# Patient Record
Sex: MALE | URBAN - METROPOLITAN AREA
[De-identification: names, ages, dates, MRNs, and addresses within clinical notes are randomized per-mention and may not be internally consistent; named-entity substitution may affect disease eponyms.]

---

## 2018-01-16 ENCOUNTER — OFFICE VISIT (OUTPATIENT)
Dept: PEDIATRICS | Facility: CLINIC | Age: 43
End: 2018-01-16
Payer: COMMERCIAL

## 2018-01-16 VITALS
SYSTOLIC BLOOD PRESSURE: 126 MMHG | WEIGHT: 149.06 LBS | HEIGHT: 64 IN | HEART RATE: 68 BPM | DIASTOLIC BLOOD PRESSURE: 80 MMHG | BODY MASS INDEX: 25.45 KG/M2 | TEMPERATURE: 96.7 F | OXYGEN SATURATION: 100 %

## 2018-01-16 DIAGNOSIS — Z23 NEED FOR PROPHYLACTIC VACCINATION AND INOCULATION AGAINST INFLUENZA: ICD-10-CM

## 2018-01-16 DIAGNOSIS — M25.561 MEDIAL KNEE PAIN, RIGHT: Primary | ICD-10-CM

## 2018-01-16 PROCEDURE — 99213 OFFICE O/P EST LOW 20 MIN: CPT | Mod: 25 | Performed by: INTERNAL MEDICINE

## 2018-01-16 PROCEDURE — 90471 IMMUNIZATION ADMIN: CPT | Performed by: INTERNAL MEDICINE

## 2018-01-16 PROCEDURE — 90686 IIV4 VACC NO PRSV 0.5 ML IM: CPT | Performed by: INTERNAL MEDICINE

## 2018-01-16 NOTE — MR AVS SNAPSHOT
After Visit Summary   1/16/2018    Curtis Montoya    MRN: [id number]           Patient Information     Date Of Birth          1975        Visit Information        Provider Department      1/16/2018 3:30 PM Dolly Islas MD [facility] [place]        Today's Diagnoses     Need for prophylactic vaccination and inoculation against influenza    -  1    Medial knee pain, right          Care Instructions    Wear supportive knee pads when at work.     Wear supportive brace when not at work.    Use ibuprofen 600mg (3 tabs) up to 3 times daily as needed for pain. Take with food as this can be hard on your stomach.    Use ice 2-3 times a day.     If symptoms are not improving in the next 2 weeks, schedule an appointment with physical therapy.           Follow-ups after your visit        Additional Services     [facility] PT, HAND, AND CHIROPRACTIC REFERRAL       **This order will print in the [facility] Scheduling Office**    Physical Therapy, Hand Therapy and Chiropractic Care are available through:    *[facility]  *[facility]  *[facility]    Call one number to schedule at any of the above locations: [phone].    Your provider has referred you to: Physical Therapy at [facility] or [facility]    Indication/Reason for Referral: Knee Pain  Onset of Illness: 4 months ago  Therapy Orders: Evaluate and Treat  Special Programs: None  Special Request: None    Marilee Hunt      Additional Comments for the Therapist or Chiropractor:     Please be aware that coverage of these services is subject to the terms and limitations of your health insurance plan.  Call member services at your health plan with any benefit or coverage questions.      Please bring the following to your appointment:    *Your personal calendar for scheduling future appointments  *Comfortable clothing                  Who to contact     If you have questions or need follow up information about today's  "clinic visit or your schedule please contact [facility] directly at [phone].  Normal or non-critical lab and imaging results will be communicated to you by MyChart, letter or phone within 4 business days after the clinic has received the results. If you do not hear from us within 7 days, please contact the clinic through BigBadhart or phone. If you have a critical or abnormal lab result, we will notify you by phone as soon as possible.  Submit refill requests through ZOCKO or call your pharmacy and they will forward the refill request to us. Please allow 3 business days for your refill to be completed.          Additional Information About Your Visit        MyChart Information     ZOCKO lets you send messages to your doctor, view your test results, renew your prescriptions, schedule appointments and more. To sign up, go to [email or web address] . Click on \"Log in\" on the left side of the screen, which will take you to the Welcome page. Then click on \"Sign up Now\" on the right side of the page.     You will be asked to enter the access code listed below, as well as some personal information. Please follow the directions to create your username and password.     Your access code is: [email or web address]  Expires: 2018  4:07 PM     Your access code will  in 90 days. If you need help or a new code, please call your [place] clinic or [phone].        Care EveryWhere ID     This is your Care EveryWhere ID. This could be used by other organizations to access your [place] medical records  [id number]        Your Vitals Were     Pulse Temperature Height Pulse Oximetry BMI (Body Mass Index)       68 96.7  F (35.9  C) (Tympanic) 5' 4\" (1.626 m) 100% 25.59 kg/m2        Blood Pressure from Last 3 Encounters:   18 126/80    Weight from Last 3 Encounters:   18 149 lb 1 oz (67.6 kg)              We Performed the Following     FLU VAC, SPLIT VIRUS IM > 3 YO (QUADRIVALENT) [49569]     [facility] PT, " HAND, AND CHIROPRACTIC REFERRAL     Vaccine Administration, Initial [18618]          Today's Medication Changes          These changes are accurate as of: 1/16/18  4:07 PM.  If you have any questions, ask your nurse or doctor.               Start taking these medicines.        Dose/Directions    order for DME   Used for:  Medial knee pain, right   Started by:  Dolly Islas MD        Equipment being ordered: knee brace with patellar support   Quantity:  1 each   Refills:  0            Where to get your medicines      Some of these will need a paper prescription and others can be bought over the counter.  Ask your nurse if you have questions.     Bring a paper prescription for each of these medications     order for DME                Primary Care Provider Office Phone # Fax #    [facility] [phone] [address] [address]        Equal Access to Services     APRIL BLANCA : Bill villarreal Soskye, waaxda luqadaha, qaybta kaalmada adeegyada, arsenio charlton . So [facility] [phone].    ATENCIÓN: Si habla español, tiene a cam disposición servicios gratuitos de asistencia lingüística. Llame al [phone].    We comply with applicable federal civil rights laws and [place] laws. We do not discriminate on the basis of race, color, national origin, age, disability, sex, sexual orientation, or gender identity.            Thank you!     Thank you for choosing [facility]  for your care. Our goal is always to provide you with excellent care. Hearing back from our patients is one way we can continue to improve our services. Please take a few minutes to complete the written survey that you may receive in the mail after your visit with us. Thank you!             Your Updated Medication List - Protect others around you: Learn how to safely use, store and throw away your medicines at www.disposemymeds.org.          This list is  accurate as of: 1/16/18  4:07 PM.  Always use your most recent med list.                   Brand Name Dispense Instructions for use Diagnosis    order for DME     1 each    Equipment being ordered: knee brace with patellar support    Medial knee pain, right

## 2018-01-16 NOTE — NURSING NOTE
"Chief Complaint   Patient presents with     Musculoskeletal Problem     Flu Shot       Initial /80 (BP Location: Right arm, Patient Position: Chair, Cuff Size: Adult Regular)  Pulse 68  Temp 96.7  F (35.9  C) (Tympanic)  Ht 5' 4\" (1.626 m)  Wt 149 lb 1 oz (67.6 kg)  SpO2 100%  BMI 25.59 kg/m2 Estimated body mass index is 25.59 kg/(m^2) as calculated from the following:    Height as of this encounter: 5' 4\" (1.626 m).    Weight as of this encounter: 149 lb 1 oz (67.6 kg).  Medication Reconciliation: complete     Tania Sanz MA 1/16/2018 3:34 PM    "

## 2018-01-16 NOTE — NURSING NOTE
Screening Questionnaire for Adult Immunization    Are you sick today?   No   Do you have allergies to medications, food, a vaccine component or latex?   No   Have you ever had a serious reaction after receiving a vaccination?   No   Do you have a long-term health problem with heart disease, lung disease, asthma, kidney disease, metabolic disease (e.g. diabetes), anemia, or other blood disorder?   No   Do you have cancer, leukemia, HIV/AIDS, or any other immune system problem?   No   In the past 3 months, have you taken medications that affect  your immune system, such as prednisone, other steroids, or anticancer drugs; drugs for the treatment of rheumatoid arthritis, Crohn s disease, or psoriasis; or have you had radiation treatments?   No   Have you had a seizure, or a brain or other nervous system problem?   No   During the past year, have you received a transfusion of blood or blood     products, or been given immune (gamma) globulin or antiviral drug?   No   For women: Are you pregnant or is there a chance you could become        pregnant during the next month?   No   Have you received any vaccinations in the past 4 weeks?   No     Immunization questionnaire answers were all negative.        Per orders of Dr. Acevedo, injection of FLU Shot given by Amie Sanz. Patient instructed to remain in clinic for 15 minutes afterwards, and to report any adverse reaction to me immediately.       Screening performed by Amie Sanz on 1/16/2018 at 3:48 PM.

## 2018-01-16 NOTE — PATIENT INSTRUCTIONS
Wear supportive knee pads when at work.     Wear supportive brace when not at work.    Use ibuprofen 600mg (3 tabs) up to 3 times daily as needed for pain. Take with food as this can be hard on your stomach.    Use ice 2-3 times a day.     If symptoms are not improving in the next 2 weeks, schedule an appointment with physical therapy.

## 2018-06-19 ENCOUNTER — OFFICE VISIT (OUTPATIENT)
Dept: URGENT CARE | Facility: URGENT CARE | Age: 43
End: 2018-06-19
Payer: COMMERCIAL

## 2018-06-19 VITALS — BODY MASS INDEX: 25.58 KG/M2 | OXYGEN SATURATION: 100 % | TEMPERATURE: 96.9 F | HEART RATE: 68 BPM | WEIGHT: 149 LBS

## 2018-06-19 DIAGNOSIS — H66.92 LEFT OTITIS MEDIA, UNSPECIFIED OTITIS MEDIA TYPE: Primary | ICD-10-CM

## 2018-06-19 PROCEDURE — 99213 OFFICE O/P EST LOW 20 MIN: CPT | Performed by: FAMILY MEDICINE

## 2018-06-19 RX ORDER — AMOXICILLIN 500 MG/1
1000 CAPSULE ORAL 2 TIMES DAILY
Qty: 40 CAPSULE | Refills: 0 | Status: SHIPPED | OUTPATIENT
Start: 2018-06-19 | End: 2018-06-29

## 2018-06-19 NOTE — MR AVS SNAPSHOT
"              After Visit Summary   2018    Curtis Montoya    MRN: [id number]           Patient Information     Date Of Birth          1975        Visit Information        Provider Department      2018 6:10 PM Griselda Ríos MD [facility]        Today's Diagnoses     Left otitis media, unspecified otitis media type    -  1       Follow-ups after your visit        Who to contact     If you have questions or need follow up information about today's clinic visit or your schedule please contact [facility] directly at [phone].  Normal or non-critical lab and imaging results will be communicated to you by Accerahart, letter or phone within 4 business days after the clinic has received the results. If you do not hear from us within 7 days, please contact the clinic through Accerahart or phone. If you have a critical or abnormal lab result, we will notify you by phone as soon as possible.  Submit refill requests through TaposÃ©Â© or call your pharmacy and they will forward the refill request to us. Please allow 3 business days for your refill to be completed.          Additional Information About Your Visit        MyChart Information     TaposÃ©Â© lets you send messages to your doctor, view your test results, renew your prescriptions, schedule appointments and more. To sign up, go to [email or web address] . Click on \"Log in\" on the left side of the screen, which will take you to the Welcome page. Then click on \"Sign up Now\" on the right side of the page.     You will be asked to enter the access code listed below, as well as some personal information. Please follow the directions to create your username and password.     Your access code is: [email or web address]  Expires: 2018  7:34 PM     Your access code will  in 90 days. If you need help or a new code, please call your [place] clinic or [phone].        Care EveryWhere ID     This is your Care EveryWhere ID. This " could be used by other organizations to access your [place] medical records  [id number]        Your Vitals Were     Pulse Temperature Pulse Oximetry BMI (Body Mass Index)          68 96.9  F (36.1  C) (Tympanic) 100% 25.58 kg/m2         Blood Pressure from Last 3 Encounters:   01/16/18 126/80    Weight from Last 3 Encounters:   06/19/18 149 lb (67.6 kg)   01/16/18 149 lb 1 oz (67.6 kg)              Today, you had the following     No orders found for display         Today's Medication Changes          These changes are accurate as of 6/19/18  7:34 PM.  If you have any questions, ask your nurse or doctor.               Start taking these medicines.        Dose/Directions    amoxicillin 500 MG capsule   Commonly known as:  AMOXIL   Used for:  Left otitis media, unspecified otitis media type   Started by:  Griselda Ríos MD        Dose:  1000 mg   Take 2 capsules (1,000 mg) by mouth 2 times daily for 10 days   Quantity:  40 capsule   Refills:  0            Where to get your medicines      These medications were sent to [facility] - [address]   [address]  [address], [place] [address]     Phone:  [phone]     amoxicillin 500 MG capsule                Primary Care Provider Office Phone # Fax #    [facility] [phone]       [address]  [place] [address]        Equal Access to Services     APRIL BLANCA AH: Hadii mark patricia hadasho Sopatricaali, waaxda luqadaha, qaybta kaalmada yara, arsenio hudson. So [facility] [phone].    ATENCIÓN: Si habla español, tiene a cam disposición servicios gratuitos de asistencia lingüística. Llame al [phone].    We comply with applicable federal civil rights laws and [place] laws. We do not discriminate on the basis of race, color, national origin, age, disability, sex, sexual orientation, or gender identity.            Thank you!     Thank you for choosing  [facility]  for your care. Our goal is always to provide you with excellent care. Hearing back from our patients is one way we can continue to improve our services. Please take a few minutes to complete the written survey that you may receive in the mail after your visit with us. Thank you!             Your Updated Medication List - Protect others around you: Learn how to safely use, store and throw away your medicines at www.disposemymeds.org.          This list is accurate as of 6/19/18  7:34 PM.  Always use your most recent med list.                   Brand Name Dispense Instructions for use Diagnosis    amoxicillin 500 MG capsule    AMOXIL    40 capsule    Take 2 capsules (1,000 mg) by mouth 2 times daily for 10 days    Left otitis media, unspecified otitis media type       order for DME     1 each    Equipment being ordered: knee brace with patellar support    Medial knee pain, right

## 2018-06-19 NOTE — PROGRESS NOTES
SUBJECTIVE:  Curtis Montoya is a 43 year old male who presents with left ear fullness, hearing loss and pressure for 2 day(s). He describes the severity of his symptoms as moderate and timing as worsening and constant. No drainage from ears or additional symptoms. Denies history of otitis media in the past. Has tried ear wax removal drops without relief. No history of allergies or recent swimming.     No past medical history on file.  Current Outpatient Prescriptions   Medication Sig Dispense Refill     order for DME Equipment being ordered: knee brace with patellar support (Patient not taking: Reported on 6/19/2018) 1 each 0     Social History   Substance Use Topics     Smoking status: Never Smoker     Smokeless tobacco: Never Used     Alcohol use No       ROS:   CONSTITUTIONAL:NEGATIVE for fever, chills, change in weight  EYES: NEGATIVE for vision changes or irritation  RESP:NEGATIVE for significant cough or SOB  GI: NEGATIVE for nausea, abdominal pain,     OBJECTIVE:  Pulse 68  Temp 96.9  F (36.1  C) (Tympanic)  Wt 149 lb (67.6 kg)  SpO2 100%  BMI 25.58 kg/m2   EXAM:  The right TM is normal: no effusions, no erythema, and normal landmarks     The right auditory canal is normal and without drainage, edema or erythema  The left TM is bulging and erythematous  The left auditory canal is erythematous  Oropharynx exam is erythematous.  There are no exudates.  GENERAL: no acute distress  SKIN: no suspicious lesions or rashes   Neck:  Supple, no LAD    ASSESSMENT:  Acute otitis media, left    PLAN:  Start Amoxicillin 1000 mg BID x 10 days. Take full course of antibiotics even if feeling better before complete. Return to care with any new or worsening symptoms.     Pt seen in conjunction with AL Puentes student, who served as a scribe for this encounter. I have reviewed the ROS and PSFH documented by the student.  I performed the pertinent history, exam, and assessment and plan components as documented above.  ~Griselda Ríos M.D.

## 2018-07-06 ENCOUNTER — OFFICE VISIT (OUTPATIENT)
Dept: PEDIATRICS | Facility: CLINIC | Age: 43
End: 2018-07-06
Payer: COMMERCIAL

## 2018-07-06 VITALS
WEIGHT: 145.9 LBS | DIASTOLIC BLOOD PRESSURE: 70 MMHG | HEART RATE: 63 BPM | BODY MASS INDEX: 24.91 KG/M2 | HEIGHT: 64 IN | OXYGEN SATURATION: 98 % | SYSTOLIC BLOOD PRESSURE: 116 MMHG | TEMPERATURE: 97.8 F

## 2018-07-06 DIAGNOSIS — Z00.00 ROUTINE GENERAL MEDICAL EXAMINATION AT A HEALTH CARE FACILITY: Primary | ICD-10-CM

## 2018-07-06 DIAGNOSIS — Z71.84 TRAVEL ADVICE ENCOUNTER: ICD-10-CM

## 2018-07-06 PROCEDURE — 80061 LIPID PANEL: CPT | Performed by: NURSE PRACTITIONER

## 2018-07-06 PROCEDURE — 82947 ASSAY GLUCOSE BLOOD QUANT: CPT | Performed by: NURSE PRACTITIONER

## 2018-07-06 PROCEDURE — 36415 COLL VENOUS BLD VENIPUNCTURE: CPT | Performed by: NURSE PRACTITIONER

## 2018-07-06 PROCEDURE — 99396 PREV VISIT EST AGE 40-64: CPT | Performed by: NURSE PRACTITIONER

## 2018-07-06 ASSESSMENT — ENCOUNTER SYMPTOMS
EYE PAIN: 0
CHILLS: 0
CONSTIPATION: 0
HEMATOCHEZIA: 0
COUGH: 0
HEMATURIA: 0
DIARRHEA: 0
ABDOMINAL PAIN: 0
DIZZINESS: 0

## 2018-07-06 NOTE — PROGRESS NOTES
SUBJECTIVE:   CC: Curtis Montoya is an 43 year old male who presents for preventative health visit.     Physical   Annual:     Getting at least 3 servings of Calcium per day:  NO    Bi-annual eye exam:  NO    Dental care twice a year:  NO    Sleep apnea or symptoms of sleep apnea:  None    Diet:  Vegetarian/vegan    Frequency of exercise:  None    Taking medications regularly:  Yes    Medication side effects:  None    Additional concerns today:  No    [facility] this [facility]. Has had hep b and a vaccines.     Today's PHQ-2 Score:   PHQ-2 ( 1999 Pfizer) 7/6/2018   Q1: Little interest or pleasure in doing things 0   Q2: Feeling down, depressed or hopeless 0   PHQ-2 Score 0   Q1: Little interest or pleasure in doing things Not at all   Q2: Feeling down, depressed or hopeless Not at all   PHQ-2 Score 0     Abuse: Current or Past(Physical, Sexual or Emotional)- No  Do you feel safe in your environment - Yes    Social History   Substance Use Topics     Smoking status: Never Smoker     Smokeless tobacco: Never Used     Alcohol use No     Alcohol Use 7/6/2018   If you drink alcohol do you typically have greater than 3 drinks per day OR greater than 7 drinks per week? Not Applicable   No flowsheet data found.    Last PSA: No results found for: PSA    Reviewed orders with patient. Reviewed health maintenance and updated orders accordingly - Yes  Labs reviewed in EPIC    Reviewed and updated as needed this visit by clinical staff  Tobacco  Allergies  Meds  Med Hx  Surg Hx  Fam Hx  Soc Hx        Reviewed and updated as needed this visit by Provider            Review of Systems   Constitutional: Negative for chills.   HENT: Negative for congestion and ear pain.    Eyes: Negative for pain.   Respiratory: Negative for cough.    Cardiovascular: Negative for chest pain.   Gastrointestinal: Negative for abdominal pain, constipation, diarrhea and hematochezia.   Genitourinary: Negative for hematuria.  "  Neurological: Negative for dizziness.       OBJECTIVE:   /70 (BP Location: Right arm, Cuff Size: Adult Regular)  Pulse 63  Temp 97.8  F (36.6  C) (Tympanic)  Ht 5' 4\" (1.626 m)  Wt 145 lb 14.4 oz (66.2 kg)  SpO2 98%  BMI 25.04 kg/m2    Physical Exam  GENERAL: healthy, alert and no distress  EYES: Eyes grossly normal to inspection, PERRL and conjunctivae and sclerae normal  HENT: ear canals and TM's normal, nose and mouth without ulcers or lesions  NECK: no adenopathy, no asymmetry, masses, or scars and thyroid normal to palpation  RESP: lungs clear to auscultation - no rales, rhonchi or wheezes  CV: regular rate and rhythm, normal S1 S2, no S3 or S4, no murmur, click or rub, no peripheral edema and peripheral pulses strong  MS: no gross musculoskeletal defects noted, no edema  SKIN: no suspicious lesions or rashes  PSYCH: mentation appears normal, affect normal/bright      ASSESSMENT/PLAN:   1. Routine general medical examination at a health care facility    - Lipid panel reflex to direct LDL Non-fasting  - Glucose    2. Travel advice encounter  We reviewed [employer] website. He is not going to area high with malaria. Is up to date with tetanus and hep a and b vacc.   - typhoid (VIVOTIF) CR capsule; Take 1 capsule by mouth every other day  Dispense: 4 capsule; Refill: 0    COUNSELING:   Reviewed preventive health counseling, as reflected in patient instructions  Special attention given to:        Regular exercise       Healthy diet/nutrition    BP Readings from Last 1 Encounters:   07/06/18 116/70     Estimated body mass index is 25.04 kg/(m^2) as calculated from the following:    Height as of this encounter: 5' 4\" (1.626 m).    Weight as of this encounter: 145 lb 14.4 oz (66.2 kg).           reports that he has never smoked. He has never used smokeless tobacco.      Counseling Resources:  ATP IV Guidelines  Pooled Cohorts Equation Calculator  FRAX Risk Assessment  ICSI Preventive Guidelines  Dietary " Guidelines for Americans, 2010  USDA's MyPlate  ASA Prophylaxis  Lung CA Screening    Aisha Whitehead, OSWALDO [facility]

## 2018-07-06 NOTE — MR AVS SNAPSHOT
After Visit Summary   7/6/2018    Curtis Montoya    MRN: [id number]           Patient Information     Date Of Birth          1975        Visit Information        Provider Department      7/6/2018 4:00 PM Aisha Mcgill APRN [facility]        Today's Diagnoses     Routine general medical examination at a health care facility    -  1    Travel advice encounter          Care Instructions      Preventive Health Recommendations  Male Ages 40 to 49    Yearly exam:             See your health care provider every year in order to  o   Review health changes.   o   Discuss preventive care.    o   Review your medicines if your doctor has prescribed any.    You should be tested each year for STDs (sexually transmitted diseases) if you re at risk.     Have a cholesterol test every 5 years.     Have a colonoscopy (test for colon cancer) if someone in your family has had colon cancer or polyps before age 50.     After age 45, have a diabetes test (fasting glucose). If you are at risk for diabetes, you should have this test every 3 years.      Talk with your health care provider about whether or not a prostate cancer screening test (PSA) is right for you.    Shots: Get a flu shot each year. Get a tetanus shot every 10 years.     Nutrition:    Eat at least 5 servings of fruits and vegetables daily.     Eat whole-grain bread, whole-wheat pasta and brown rice instead of white grains and rice.     Get adequate Calcium and Vitamin D.     Lifestyle    Exercise for at least 150 minutes a week (30 minutes a day, 5 days a week). This will help you control your weight and prevent disease.     Limit alcohol to one drink per day.     No smoking.     Wear sunscreen to prevent skin cancer.     See your dentist every six months for an exam and cleaning.              Follow-ups after your visit        Follow-up notes from your care team     Return in about 1 year (around 7/6/2019) for Annual  "preventative exam.      Who to contact     If you have questions or need follow up information about today's clinic visit or your schedule please contact [facility] [place] directly at [phone].  Normal or non-critical lab and imaging results will be communicated to you by MyChart, letter or phone within 4 business days after the clinic has received the results. If you do not hear from us within 7 days, please contact the clinic through MyChart or phone. If you have a critical or abnormal lab result, we will notify you by phone as soon as possible.  Submit refill requests through Ahaali or call your pharmacy and they will forward the refill request to us. Please allow 3 business days for your refill to be completed.          Additional Information About Your Visit        [facility] Information     Ahaali lets you send messages to your doctor, view your test results, renew your prescriptions, schedule appointments and more. To sign up, go to [email or web address] . Click on \"Log in\" on the left side of the screen, which will take you to the Welcome page. Then click on \"Sign up Now\" on the right side of the page.     You will be asked to enter the access code listed below, as well as some personal information. Please follow the directions to create your username and password.     Your access code is: [email or web address]  Expires: 2018  7:34 PM     Your access code will  in 90 days. If you need help or a new code, please call your [place] clinic or [phone].        Care EveryWhere ID     This is your Care EveryWhere ID. This could be used by other organizations to access your [place] medical records  [id number]        Your Vitals Were     Pulse Temperature Height Pulse Oximetry BMI (Body Mass Index)       63 97.8  F (36.6  C) (Tympanic) 5' 4\" (1.626 m) 98% 25.04 kg/m2        Blood Pressure from Last 3 Encounters:   18 116/70   18 126/80    Weight from Last 3 Encounters:   18 145 " lb 14.4 oz (66.2 kg)   06/19/18 149 lb (67.6 kg)   01/16/18 149 lb 1 oz (67.6 kg)              We Performed the Following     Glucose     Lipid panel reflex to direct LDL Non-fasting          Today's Medication Changes          These changes are accurate as of 7/6/18  4:09 PM.  If you have any questions, ask your nurse or doctor.               Start taking these medicines.        Dose/Directions    typhoid CR capsule   Commonly known as:  VIVOTIF   Used for:  Travel advice encounter   Started by:  Aisha Mcgill APRN CNP        Dose:  1 capsule   Take 1 capsule by mouth every other day   Quantity:  4 capsule   Refills:  0            Where to get your medicines      These medications were sent to [id number] - [place], [place] - [address]  AT [address]  [address] [place] [address]     Phone:  [phone]     typhoid CR capsule                Primary Care Provider Office Phone # Fax #    OSWALDO Spann [phone]       [facility] [place] [address]        Equal Access to Services     [facility]: Hadii aad ku hadasho Soomaali, waaxda luqadaha, qaybta kaalmada adeegyada, arsenio mauricio hayjohn charlton . So [facility] [phone].    ATENCIÓN: Si habla español, tiene a cam disposición servicios gratuitos de asistencia lingüística. [facility] [phone].    We comply with applicable federal civil rights laws and [place] laws. We do not discriminate on the basis of race, color, national origin, age, disability, sex, sexual orientation, or gender identity.            Thank you!     Thank you for choosing [facility] [place]  for your care. Our goal is always to provide you with excellent care. Hearing back from our patients is one way we can continue to improve our services. Please take a few minutes to complete the written survey that you may receive in the mail after your visit with us. Thank you!              Your Updated Medication List - Protect others around you: Learn how to safely use, store and throw away your medicines at www.disposemymeds.org.          This list is accurate as of 7/6/18  4:09 PM.  Always use your most recent med list.                   Brand Name Dispense Instructions for use Diagnosis    typhoid CR capsule    VIVOTIF    4 capsule    Take 1 capsule by mouth every other day    Travel advice encounter

## 2018-07-07 LAB
CHOLEST SERPL-MCNC: 234 MG/DL
GLUCOSE SERPL-MCNC: 90 MG/DL (ref 70–99)
HDLC SERPL-MCNC: 44 MG/DL
LDLC SERPL CALC-MCNC: 164 MG/DL
NONHDLC SERPL-MCNC: 190 MG/DL
TRIGL SERPL-MCNC: 128 MG/DL

## 2025-05-19 NOTE — PROGRESS NOTES
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 He is a , and is regularly bending on his knees or going up or down stairs. He reports that the pain seems to come intermittently, usually when using stairs. No injury that he can recall, no swelling, redness or warmth. The pain localizes over his medial knee. He has not tried anything for it as he cannot predict when it will come. No symptoms on the L side.     Problem list and histories reviewed & adjusted, as indicated.  Additional history: as documented    There is no problem list on file for this patient.    History reviewed. No pertinent surgical history.    Social History   Substance Use Topics     Smoking status: Never Smoker     Smokeless tobacco: Never Used     Alcohol use No     Family History   Problem Relation Age of Onset     Hypertension Mother              Reviewed and updated as needed this visit by clinical staffTobacco  Allergies  Meds  Med Hx  Surg Hx  Fam Hx  Soc Hx        ROS:  Constitutional, MSK systems are negative, except as otherwise noted.      OBJECTIVE:   /80 (BP Location: Right arm, Patient Position: Chair, Cuff Size: Adult Regular)  Pulse 68  Temp 96.7  F (35.9  C) (Tympanic)  Ht 5' 4\" (1.626 m)  Wt 149 lb 1 oz (67.6 kg)  SpO2 100%  BMI 25.59 kg/m2  Body mass index is 25.59 kg/(m^2).  GENERAL: healthy, " Pt's mom left a voicemail over the weekend to schedule a post op visit for Pt. Staff reached out today and left a voicemail for Pt to call back and schedule her post op visit.    alert and no distress  MS: R knee without swelling, effusion, erythema or warmth. Minimal pain over proximal tibia. No tenderness with Rommel or valgus/varus stress testing. Negative Apley grind test. Negative anterior/posterior drawer.      Diagnostic Test Results:  none     ASSESSMENT/PLAN:     1. Medial knee pain, right  Anticipate this is likely a strain/overuse injury to MCL. Reassuring exam. At this time, will try bracing, NSAIDs and ice as patient has not tried any home therapies. Can go to physical therapy if these do not help.   - order for DME; Equipment being ordered: knee brace with patellar support  Dispense: 1 each; Refill: 0  - [facility] PT, HAND, AND CHIROPRACTIC REFERRAL    2. Need for prophylactic vaccination and inoculation against influenza  - FLU VAC, SPLIT VIRUS IM > 3 YO (QUADRIVALENT) [81110]  - Vaccine Administration, Initial [09822]    Patient Instructions   Wear supportive knee pads when at work.     Wear supportive brace when not at work.    Use ibuprofen 600mg (3 tabs) up to 3 times daily as needed for pain. Take with food as this can be hard on your stomach.    Use ice 2-3 times a day.     If symptoms are not improving in the next 2 weeks, schedule an appointment with physical therapy.       Dolly Still MD  [facility]  Injectable Influenza Immunization Documentation    1.  Is the person to be vaccinated sick today?   No    2. Does the person to be vaccinated have an allergy to a component   of the vaccine?   No  Egg Allergy Algorithm Link    3. Has the person to be vaccinated ever had a serious reaction   to influenza vaccine in the past?   No    4. Has the person to be vaccinated ever had Guillain-Barré syndrome?   No    Form completed by Tania Sanz MA 1/16/2018 3:31 PM